# Patient Record
Sex: MALE | Race: OTHER | Employment: FULL TIME | URBAN - METROPOLITAN AREA
[De-identification: names, ages, dates, MRNs, and addresses within clinical notes are randomized per-mention and may not be internally consistent; named-entity substitution may affect disease eponyms.]

---

## 2018-07-07 ENCOUNTER — HOSPITAL ENCOUNTER (OUTPATIENT)
Age: 33
Discharge: HOME | End: 2018-07-07
Attending: FAMILY MEDICINE
Payer: COMMERCIAL

## 2018-07-07 VITALS
TEMPERATURE: 97 F | OXYGEN SATURATION: 100 % | RESPIRATION RATE: 18 BRPM | HEART RATE: 67 BPM | DIASTOLIC BLOOD PRESSURE: 78 MMHG | SYSTOLIC BLOOD PRESSURE: 113 MMHG

## 2018-07-07 DIAGNOSIS — H10.32 ACUTE CONJUNCTIVITIS OF LEFT EYE, UNSPECIFIED ACUTE CONJUNCTIVITIS TYPE: Primary | ICD-10-CM

## 2018-07-07 PROCEDURE — 99204 OFFICE O/P NEW MOD 45 MIN: CPT

## 2018-07-07 PROCEDURE — 99203 OFFICE O/P NEW LOW 30 MIN: CPT

## 2018-07-07 RX ORDER — TOBRAMYCIN AND DEXAMETHASONE 3; 1 MG/ML; MG/ML
2 SUSPENSION/ DROPS OPHTHALMIC
Qty: 5 ML | Refills: 0 | Status: SHIPPED | OUTPATIENT
Start: 2018-07-07 | End: 2018-07-14

## 2018-07-07 NOTE — ED PROVIDER NOTES
Patient Seen in: [address] [address]    History   Patient presents with:  Eye Problem    Stated Complaint: eye issue    HPI  31yo male presents with several days of left eye redness with some itching and discharge.   Was building and no hordeolum. No foreign body present in the right eye. Left eye exhibits no chemosis, no discharge, no exudate and no hordeolum. No foreign body present in the left eye. Right conjunctiva is not injected. Right conjunctiva has no hemorrhage.  Left conj management        Medications Prescribed:  Current Discharge Medication List    START taking these medications    tobramycin-dexamethasone 0.3-0.1 % Ophthalmic Suspension  Place 2 drops into the left eye every 4 (four) hours while awake.   Qty: 5 mL Refills

## 2018-07-07 NOTE — ED NOTES
Discharge instructions reviewed with patient. Medication sent to pharmacy. All questions answered to patient's satisfaction.

## 2018-07-07 NOTE — ED INITIAL ASSESSMENT (HPI)
Patient comes in with left eye redness and irritation. He was building a fence and may have gotten something in it. No drainage.